# Patient Record
Sex: MALE | Race: WHITE | NOT HISPANIC OR LATINO | Employment: FULL TIME | URBAN - METROPOLITAN AREA
[De-identification: names, ages, dates, MRNs, and addresses within clinical notes are randomized per-mention and may not be internally consistent; named-entity substitution may affect disease eponyms.]

---

## 2019-03-03 ENCOUNTER — TELEPHONE (OUTPATIENT)
Dept: SCHEDULING | Facility: IMAGING CENTER | Age: 24
End: 2019-03-03

## 2019-03-17 ENCOUNTER — OFFICE VISIT (OUTPATIENT)
Dept: URGENT CARE | Facility: CLINIC | Age: 24
End: 2019-03-17
Payer: COMMERCIAL

## 2019-03-17 ENCOUNTER — APPOINTMENT (OUTPATIENT)
Dept: RADIOLOGY | Facility: IMAGING CENTER | Age: 24
End: 2019-03-17
Attending: FAMILY MEDICINE
Payer: COMMERCIAL

## 2019-03-17 VITALS
SYSTOLIC BLOOD PRESSURE: 110 MMHG | RESPIRATION RATE: 16 BRPM | DIASTOLIC BLOOD PRESSURE: 68 MMHG | HEART RATE: 76 BPM | TEMPERATURE: 99.2 F | BODY MASS INDEX: 21 KG/M2 | OXYGEN SATURATION: 97 % | HEIGHT: 71 IN | WEIGHT: 150 LBS

## 2019-03-17 DIAGNOSIS — M25.532 ACUTE PAIN OF LEFT WRIST: ICD-10-CM

## 2019-03-17 DIAGNOSIS — S63.502A SPRAIN OF LEFT WRIST, INITIAL ENCOUNTER: ICD-10-CM

## 2019-03-17 PROCEDURE — 99202 OFFICE O/P NEW SF 15 MIN: CPT | Performed by: FAMILY MEDICINE

## 2019-03-17 PROCEDURE — 73110 X-RAY EXAM OF WRIST: CPT | Mod: TC,LT | Performed by: FAMILY MEDICINE

## 2019-03-17 RX ORDER — IBUPROFEN 200 MG
600 TABLET ORAL ONCE
Status: COMPLETED | OUTPATIENT
Start: 2019-03-17 | End: 2019-03-17

## 2019-03-17 RX ORDER — ATENOLOL 100 MG/1
100 TABLET ORAL DAILY
COMMUNITY

## 2019-03-17 RX ADMIN — Medication 600 MG: at 18:37

## 2019-03-17 ASSESSMENT — ENCOUNTER SYMPTOMS
SENSORY CHANGE: 0
WEIGHT LOSS: 0
FOCAL WEAKNESS: 0

## 2019-03-18 NOTE — PROGRESS NOTES
"Subjective:      Edgardo St is a 23 y.o. male who presents with Wrist Injury (Lt wrist he doesnt know, he was at work and he is squeeshing a dog toy and that started the pain, when he turn his hand to a supine position it radiates the pain to his upper arm)            Onset today left wrist pain triggered by squeezing a dog toy. Pain is severe and worse with any movement. No prior injury. No PMH RA/inflammatory arthritis. Right hand dominant. No OTC medications. No other aggravating or alleviating factors.          Review of Systems   Constitutional: Negative for malaise/fatigue and weight loss.   Musculoskeletal:        No other joints affected   Skin: Negative for itching and rash.   Neurological: Negative for sensory change and focal weakness.     .  Medications, Allergies, and current problem list reviewed today in Epic       Objective:     /68   Pulse 76   Temp 37.3 °C (99.2 °F)   Resp 16   Ht 1.803 m (5' 11\")   Wt 68 kg (150 lb)   SpO2 97%   BMI 20.92 kg/m²      Physical Exam   Constitutional: He is oriented to person, place, and time. He appears well-developed. No distress.   HENT:   Head: Normocephalic.   Eyes: Conjunctivae are normal.   Musculoskeletal:   Left wrist: diffusely tender without deformity. No swelling or warmth. Pain reproduced with movement in all planes. Distal neuro/vascular intact.      Neurological: He is alert and oriented to person, place, and time.   Skin: Skin is warm and dry.               Assessment/Plan:   Xray: no fracture or dislocation by my read. Radiology review pending.    1. Sprain of left wrist, initial encounter     2. Acute pain of left wrist  DX-WRIST-COMPLETE 3+ LEFT     Differential diagnosis, natural history, supportive care, and indications for immediate follow-up discussed at length.     Relative rest, ice, nsaid prn. Elevation and compression prn swelling. Resume activity as tolerated.        "

## 2019-04-01 ENCOUNTER — OFFICE VISIT (OUTPATIENT)
Dept: URGENT CARE | Facility: MEDICAL CENTER | Age: 24
End: 2019-04-01
Payer: COMMERCIAL

## 2019-04-01 VITALS
SYSTOLIC BLOOD PRESSURE: 108 MMHG | TEMPERATURE: 98.6 F | HEART RATE: 78 BPM | HEIGHT: 71 IN | WEIGHT: 168 LBS | DIASTOLIC BLOOD PRESSURE: 70 MMHG | BODY MASS INDEX: 23.52 KG/M2 | OXYGEN SATURATION: 98 %

## 2019-04-01 DIAGNOSIS — R51.9 SINUS HEADACHE: ICD-10-CM

## 2019-04-01 DIAGNOSIS — H10.32 ACUTE BACTERIAL CONJUNCTIVITIS OF LEFT EYE: ICD-10-CM

## 2019-04-01 DIAGNOSIS — J01.40 ACUTE NON-RECURRENT PANSINUSITIS: Primary | ICD-10-CM

## 2019-04-01 PROCEDURE — 99214 OFFICE O/P EST MOD 30 MIN: CPT | Performed by: PHYSICIAN ASSISTANT

## 2019-04-01 RX ORDER — EMTRICITABINE AND TENOFOVIR DISOPROXIL FUMARATE 200; 300 MG/1; MG/1
1 TABLET, FILM COATED ORAL DAILY
COMMUNITY
End: 2020-03-31

## 2019-04-01 RX ORDER — AMOXICILLIN AND CLAVULANATE POTASSIUM 875; 125 MG/1; MG/1
1 TABLET, FILM COATED ORAL 2 TIMES DAILY
Qty: 14 TAB | Refills: 0 | Status: SHIPPED | OUTPATIENT
Start: 2019-04-01 | End: 2019-04-08

## 2019-04-01 RX ORDER — POLYMYXIN B SULFATE AND TRIMETHOPRIM 1; 10000 MG/ML; [USP'U]/ML
1 SOLUTION OPHTHALMIC EVERY 4 HOURS
Qty: 10 ML | Refills: 0 | Status: SHIPPED | OUTPATIENT
Start: 2019-04-01 | End: 2019-04-05

## 2019-04-01 RX ORDER — IBUPROFEN 800 MG/1
800 TABLET ORAL EVERY 8 HOURS PRN
Qty: 30 TAB | Refills: 3 | Status: SHIPPED | OUTPATIENT
Start: 2019-04-01 | End: 2019-04-05

## 2019-04-01 NOTE — PROGRESS NOTES
Subjective:      Pt is a 23 y.o. male who presents with Headache (x1week , left eye was swollen for two days with discharge (not now), congested, was coughing . headache)            HPI  This is a new problem. PT presents to  clinic today complaining of sore throat, watery red eyes, pressure in ears, cough, fatigue, runny nose, post nasal drip, sinus pressure and headache. PT denies CP, SOB, NVD, abdominal pain, joint pain. PT states these symptoms began around 1 week ago. PT states the pain is a 6/10 with sinus headache, aching in nature and worse at night.  Pt has not taken any RX medications for this condition. The pt's medication list, problem list, and allergies have been evaluated and reviewed during today's visit.    PMH:  Negative per pt.      PSH:  Negative per pt.      Fam Hx:  the patient's family history is not pertinent to their current complaint    Soc HX:  Social History     Social History   • Marital status: Single     Spouse name: N/A   • Number of children: N/A   • Years of education: N/A     Occupational History   • Not on file.     Social History Main Topics   • Smoking status: Never Smoker   • Smokeless tobacco: Never Used   • Alcohol use Yes      Comment: occa   • Drug use: No   • Sexual activity: Not on file     Other Topics Concern   • Not on file     Social History Narrative   • No narrative on file         Medications:    Current Outpatient Prescriptions:   •  emtricitabine-tenofovir (TRUVADA) 200-300 MG per tablet, Take 1 Tab by mouth every day., Disp: , Rfl:   •  ibuprofen (MOTRIN) 800 MG Tab, Take 1 Tab by mouth every 8 hours as needed for up to 10 days., Disp: 30 Tab, Rfl: 3  •  amoxicillin-clavulanate (AUGMENTIN) 875-125 MG Tab, Take 1 Tab by mouth 2 times a day for 7 days., Disp: 14 Tab, Rfl: 0  •  polymixin-trimethoprim (POLYTRIM) 83010-3.1 UNIT/ML-% Solution, Place 1 Drop in both eyes every 4 hours., Disp: 10 mL, Rfl: 0  •  atenolol (TENORMIN) 100 MG Tab, Take 100 mg by mouth  "every day., Disp: , Rfl:       Allergies:  Patient has no known allergies.    ROS  Review of Systems   Constitutional: Positive for malaise/fatigue. Negative for fever.   HENT: Positive for congestion and sore throat from postnasal drip with associated sinus pressure and fullness.    Eyes: Negative for blurred vision, double vision and photophobia. +left eye redness  Respiratory: Positive for cough and sputum production. Negative for hemoptysis, shortness of breath and wheezing.    Cardiovascular: Negative for chest pain and palpitations.   Gastrointestinal: Negative for nausea, vomiting, abdominal pain, diarrhea and constipation.   Genitourinary: Negative for dysuria and flank pain.   Musculoskeletal: Negative for joint pain and myalgias.   Skin: Negative for itching and rash.   Neurological: Positive for headaches. Negative for dizziness and tingling.   Endo/Heme/Allergies: Does not bruise/bleed easily.   Psychiatric/Behavioral: Negative for depression. The patient is not nervous/anxious.           Objective:     /70   Pulse 78   Temp 37 °C (98.6 °F) (Temporal)   Ht 1.803 m (5' 11\")   Wt 76.2 kg (168 lb)   SpO2 98%   BMI 23.43 kg/m²      Physical Exam            Physical Exam   Constitutional: Pt is oriented to person, place, and time. Pt appears well-developed and well-nourished. No distress.   HENT:   Head: Normocephalic and atraumatic.   Right Ear: Hearing, tympanic membrane, external ear and ear canal normal.   Left Ear: Hearing, tympanic membrane, external ear and ear canal normal.   Nose: Mucosal edema, rhinorrhea and sinus tenderness present. No nose lacerations, nasal deformity, septal deviation or nasal septal hematoma. No epistaxis.  No foreign bodies. Right sinus exhibits maxillary sinus tenderness and frontal sinus tenderness. Left sinus exhibits maxillary sinus tenderness and frontal sinus tenderness.   Mouth/Throat: Oropharynx is clear and moist. No oropharyngeal exudate.   Eyes: " Conjunctivae injected and EOM are normal. Pupils are equal, round, and reactive to light. Right eye exhibits no discharge. Left eye exhibits discharge.  Neck: Normal range of motion. Neck supple. No tracheal deviation present. No thyromegaly present.   Cardiovascular: Normal rate, regular rhythm, normal heart sounds and intact distal pulses.  Exam reveals no gallop and no friction rub.    No murmur heard.  Pulmonary/Chest: Effort normal and breath sounds normal. No respiratory distress. Pt has no wheezes. Pt has no rales. Pt exhibits no tenderness.   Abdominal: Soft. Bowel sounds are normal. Pt exhibits no distension and no mass. There is no tenderness. There is no rebound and no guarding.   Musculoskeletal: Normal range of motion. Pt exhibits no edema and no tenderness.   Lymphadenopathy:     Pt has no cervical adenopathy.   Neurological: Pt is alert and oriented to person, place, and time. Pt has normal reflexes. Pt displays normal reflexes. No cranial nerve deficit. Pt exhibits normal muscle tone. Coordination normal.   Skin: Skin is warm and dry. No rash noted. No erythema.   Psychiatric: Pt has a normal mood and affect. Pt behavior is normal. Judgment and thought content normal.       Assessment/Plan:     1. Acute non-recurrent pansinusitis    - amoxicillin-clavulanate (AUGMENTIN) 875-125 MG Tab; Take 1 Tab by mouth 2 times a day for 7 days.  Dispense: 14 Tab; Refill: 0    2. Acute bacterial conjunctivitis of left eye    - polymixin-trimethoprim (POLYTRIM) 78738-8.1 UNIT/ML-% Solution; Place 1 Drop in both eyes every 4 hours.  Dispense: 10 mL; Refill: 0    3. Sinus headache    - ibuprofen (MOTRIN) 800 MG Tab; Take 1 Tab by mouth every 8 hours as needed for up to 10 days.  Dispense: 30 Tab; Refill: 3    Rest, fluids encouraged.  OTC decongestant for congestion  Note given for work.  AVS with medical info given.  Pt was in full understanding and agreement with the plan.  Differential diagnosis, natural history,  supportive care, and indications for immediate follow-up discussed. All questions answered. Patient agrees with the plan of care.  Follow-up as needed if symptoms worsen or fail to improve.

## 2019-04-01 NOTE — LETTER
April 1, 2019       Patient: Edgardo St   YOB: 1995   Date of Visit: 4/1/2019         To Whom It May Concern:    It is my medical opinion that Edgardo St may be excused from work for the dates of 4/1/19-4/3/19.      If you have any questions or concerns, please don't hesitate to call [phone]          Sincerely,          Claude Iraheta P.A.-C.  Electronically Signed

## 2019-04-05 ENCOUNTER — OFFICE VISIT (OUTPATIENT)
Dept: INTERNAL MEDICINE | Facility: MEDICAL CENTER | Age: 24
End: 2019-04-05
Payer: COMMERCIAL

## 2019-04-05 VITALS
DIASTOLIC BLOOD PRESSURE: 62 MMHG | SYSTOLIC BLOOD PRESSURE: 96 MMHG | OXYGEN SATURATION: 97 % | HEART RATE: 72 BPM | TEMPERATURE: 98.4 F | BODY MASS INDEX: 22.4 KG/M2 | HEIGHT: 71 IN | WEIGHT: 160 LBS

## 2019-04-05 DIAGNOSIS — J01.90 ACUTE NON-RECURRENT SINUSITIS, UNSPECIFIED LOCATION: ICD-10-CM

## 2019-04-05 DIAGNOSIS — Z00.00 HEALTHCARE MAINTENANCE: ICD-10-CM

## 2019-04-05 DIAGNOSIS — I42.1 HYPERTROPHIC OBSTRUCTIVE CARDIOMYOPATHY (HOCM) (HCC): ICD-10-CM

## 2019-04-05 DIAGNOSIS — H10.32 ACUTE BACTERIAL CONJUNCTIVITIS OF LEFT EYE: ICD-10-CM

## 2019-04-05 PROCEDURE — 99204 OFFICE O/P NEW MOD 45 MIN: CPT | Mod: GC | Performed by: INTERNAL MEDICINE

## 2019-04-05 RX ORDER — TOBRAMYCIN 3 MG/ML
SOLUTION/ DROPS OPHTHALMIC
Refills: 0 | COMMUNITY
Start: 2019-03-28 | End: 2019-04-05

## 2019-04-05 RX ORDER — ATENOLOL 50 MG/1
75 TABLET ORAL
Refills: 11 | COMMUNITY
Start: 2019-01-09 | End: 2019-04-05

## 2019-04-05 ASSESSMENT — PAIN SCALES - GENERAL: PAINLEVEL: 3=SLIGHT PAIN

## 2019-04-05 ASSESSMENT — PATIENT HEALTH QUESTIONNAIRE - PHQ9
5. POOR APPETITE OR OVEREATING: 0 - NOT AT ALL
CLINICAL INTERPRETATION OF PHQ2 SCORE: 2
SUM OF ALL RESPONSES TO PHQ QUESTIONS 1-9: 6

## 2019-04-05 NOTE — PROGRESS NOTES
New Patient to Establish    Reason to establish: New patient to establish    CC: Referral made for cardiology, establish care, conjunctivitis    HPI: Mr. St is a very pleasant 23-year-old male with past medical history significant for hypertrophic obstructive cardiomyopathy presents to the clinic to establish care.    His previous primary care physician is in [facility], and is receiving some health care from the [facility] as well.  He has been doing fine and no current complaints.    Reports he recently got tested for renal function and STD testing about a week and half ago at the [facility].  Was told all the labs look normal.  He is okay to sign release request form for records from the place.    Has been following Dr. Merchant, pediatric cardiologist since he was diagnosed with hypertrophic obstructive cardiomyopathy.  He wishes to continue follow-up with her.  He is on atenolol, and has been taking it for the past several years now.  Reports his heart condition is stable, and he gets yearly testing including echo, EKG and stress test.    He is also on Truvada for preexposure prophylaxis.  Sometimes he uses barrier protection, and other times he does not.      Patient Active Problem List    Diagnosis Date Noted   • Hypertrophic obstructive cardiomyopathy (HOCM) ([facility]) 04/05/2019       Past Medical History:   Diagnosis Date   • Cardiomyopathy ([facility])        Current Outpatient Prescriptions   Medication Sig Dispense Refill   • emtricitabine-tenofovir (TRUVADA) 200-300 MG per tablet Take 1 Tab by mouth every day.     • amoxicillin-clavulanate (AUGMENTIN) 875-125 MG Tab Take 1 Tab by mouth 2 times a day for 7 days. 14 Tab 0   • atenolol (TENORMIN) 100 MG Tab Take 100 mg by mouth every day.       No current facility-administered medications for this visit.        Allergies as of 04/05/2019   • (No Known Allergies)       Social History     Social History   • Marital  "status: Single     Spouse name: N/A   • Number of children: N/A   • Years of education: N/A     Occupational History   • Not on file.     Social History Main Topics   • Smoking status: Never Smoker   • Smokeless tobacco: Never Used   • Alcohol use 0.0 - 0.6 oz/week   • Drug use: No   • Sexual activity: No     Other Topics Concern   • Not on file     Social History Narrative   • No narrative on file       Family History   Problem Relation Age of Onset   • Psychiatry Mother         Mental health   • Heart Disease Father         Hypertrophic cardiomyopathy   • Diabetes Father    • Stroke Sister         Age 27   • Cancer Neg Hx        Past Surgical History:   Procedure Laterality Date   • COLONOSCOPY      [facility] at age 22. Was normal.       ROS: As per HPI. Additional pertinent systems as noted below.  Constitutional: Negative for chills and fever.   HENT: Negative for ear pain and sore throat.    Eyes: Negative for discharge and redness.   Respiratory: Negative for cough, hemoptysis, wheezing and stridor.    Cardiovascular: Negative for chest pain, palpitations and leg swelling.   Gastrointestinal: Negative for abdominal pain, constipation, diarrhea, heartburn, nausea and vomiting.   Genitourinary: Negative for dysuria, flank pain and hematuria.   Musculoskeletal: Negative for falls and myalgias.   Skin: Negative for itching and rash.   Neurological: Negative for dizziness, seizures, loss of consciousness and headaches.   Endo/Heme/Allergies: Negative for polydipsia. Does not bruise/bleed easily.   Psychiatric/Behavioral: Negative for substance abuse and suicidal ideas.       BP (!) 96/62 (BP Location: Left arm, Patient Position: Sitting, BP Cuff Size: Adult)   Pulse 72   Temp 36.9 °C (98.4 °F) (Temporal)   Ht 1.81 m (5' 11.26\")   Wt 72.6 kg (160 lb)   SpO2 97%   BMI 22.15 kg/m²     Physical Exam  General:  Alert and oriented, No apparent distress.    Eyes: Pupils equal and reactive. No scleral " icterus.    Throat: Clear no erythema or exudates noted.    Neck: Supple. No lymphadenopathy noted. Thyroid not enlarged.    Lungs: Clear to auscultation and percussion bilaterally.    Cardiovascular: Regular rate and rhythm. No murmurs, rubs or gallops.    Abdomen:  Benign. No rebound or guarding noted.    Extremities: No clubbing, cyanosis, edema.    Skin: Clear. No rash or suspicious skin lesions noted.    Ear: Right ear is slightly inflamed, improving.    Note: I have reviewed all pertinent labs and diagnostic tests associated with this visit with specific comments listed under the assessment and plan below    Assessment and Plan    1. Hypertrophic obstructive cardiomyopathy (HOCM) ([facility])  Following Dr. Merchant.  Stable, continue atenolol per cardiology.  Gets yearly echoes, EKGs and treadmill stress test.  He avoids extreme physical exertion.  No recent symptoms of loss of consciousness, dizziness, palpitations or chest pain.  - Comp Metabolic Panel; Future  - REFERRAL TO CARDIOLOGY    2. Acute non-recurrent sinusitis, unspecified location  No tenderness present.  Appears to be improving.  Complete remaining 2 days of antibiotics.    3. Acute bacterial conjunctivitis of left eye  No signs of conjunctivitis.  Was using tobramycin eyedrops.  Advised to stop eyedrops.    4.  Preexposure prophylaxis for HIV  Patient reports that he does not have stable partner.  Counseled patient strongly recommended using barrier protection as well.  Continue Truvada for now, patient reports that he has refills.  Advised HIV testing every 3 months, renal function every 6 months.  He does not need to come into clinic for lab test but we advised him that at time of refills we can get the lab tests and follow up on it.  Recommended yearly follow-ups.    5. Healthcare maintenance  Up-to-date on vaccines.  Will get HIV screening in about 3 months, got it done recently.  - HIV AG/AB COMBO ASSAY SCREENING; Future        Followup: Return in  about 6 months (around 10/5/2019), or if symptoms worsen or fail to improve.        Signed by: Orville Asif M.D.

## 2019-04-09 DIAGNOSIS — I42.1 HYPERTROPHIC OBSTRUCTIVE CARDIOMYOPATHY (HOCM) (HCC): ICD-10-CM

## 2020-01-03 ENCOUNTER — OFFICE VISIT (OUTPATIENT)
Dept: URGENT CARE | Facility: CLINIC | Age: 25
End: 2020-01-03
Payer: COMMERCIAL

## 2020-01-03 VITALS
OXYGEN SATURATION: 95 % | HEIGHT: 72 IN | WEIGHT: 180.2 LBS | SYSTOLIC BLOOD PRESSURE: 120 MMHG | HEART RATE: 71 BPM | BODY MASS INDEX: 24.41 KG/M2 | TEMPERATURE: 97.3 F | DIASTOLIC BLOOD PRESSURE: 82 MMHG | RESPIRATION RATE: 14 BRPM

## 2020-01-03 DIAGNOSIS — H92.01 RIGHT EAR PAIN: ICD-10-CM

## 2020-01-03 DIAGNOSIS — H72.91 PERFORATION OF RIGHT TYMPANIC MEMBRANE: ICD-10-CM

## 2020-01-03 DIAGNOSIS — H66.013 NON-RECURRENT ACUTE SUPPURATIVE OTITIS MEDIA OF BOTH EARS WITH SPONTANEOUS RUPTURE OF TYMPANIC MEMBRANES: ICD-10-CM

## 2020-01-03 PROCEDURE — 99214 OFFICE O/P EST MOD 30 MIN: CPT | Performed by: NURSE PRACTITIONER

## 2020-01-03 RX ORDER — AMOXICILLIN AND CLAVULANATE POTASSIUM 875; 125 MG/1; MG/1
1 TABLET, FILM COATED ORAL 2 TIMES DAILY
Qty: 20 TAB | Refills: 0 | Status: SHIPPED | OUTPATIENT
Start: 2020-01-03 | End: 2020-01-13

## 2020-01-03 ASSESSMENT — ENCOUNTER SYMPTOMS
VOMITING: 0
EYE PAIN: 0
FEVER: 0
SHORTNESS OF BREATH: 0
MYALGIAS: 0
RHINORRHEA: 0
COUGH: 0
CHILLS: 0
SORE THROAT: 0
DIZZINESS: 0
NAUSEA: 0

## 2020-01-04 NOTE — PROGRESS NOTES
"Subjective:   Edgardo St  is a 24 y.o. male who presents for Otalgia (Bilateral x10 days)        Otalgia    There is pain in both ears. This is a new problem. Episode onset: 10 days; right ear severely worse after driving over daughter past this evening felt a \"pop\" followed by nausea and dizziness. The problem occurs constantly. The problem has been gradually worsening. There has been no fever. The pain is at a severity of 10/10. The pain is severe. Associated symptoms include hearing loss. Pertinent negatives include no coughing, ear discharge, rash, rhinorrhea, sore throat or vomiting. He has tried acetaminophen for the symptoms. The treatment provided no relief. There is no history of a chronic ear infection or hearing loss.     Review of Systems   Constitutional: Negative for chills and fever.   HENT: Positive for ear pain, hearing loss and tinnitus. Negative for ear discharge, rhinorrhea and sore throat.    Eyes: Negative for pain.   Respiratory: Negative for cough and shortness of breath.    Cardiovascular: Negative for chest pain.   Gastrointestinal: Negative for nausea and vomiting.   Genitourinary: Negative for hematuria.   Musculoskeletal: Negative for myalgias.   Skin: Negative for rash.   Neurological: Negative for dizziness.     No Known Allergies   Objective:   /82   Pulse 71   Temp 36.3 °C (97.3 °F)   Resp 14   Ht 1.829 m (6')   Wt 81.7 kg (180 lb 3.2 oz)   SpO2 95%   BMI 24.44 kg/m²   Physical Exam  Nursing note reviewed.   Constitutional:       General: He is not in acute distress.     Appearance: He is well-developed.   HENT:      Head: Normocephalic and atraumatic.      Right Ear: External ear normal. No drainage, swelling or tenderness. Tympanic membrane is perforated. Tympanic membrane is not bulging.      Left Ear: External ear normal. Tympanic membrane is erythematous and bulging.      Nose: Nose normal.      Right Sinus: No maxillary sinus tenderness or frontal sinus " tenderness.      Left Sinus: No maxillary sinus tenderness or frontal sinus tenderness.      Mouth/Throat:      Mouth: Mucous membranes are moist.      Pharynx: Uvula midline. No posterior oropharyngeal erythema.      Tonsils: No tonsillar exudate or tonsillar abscesses.   Eyes:      General:         Right eye: No discharge.         Left eye: No discharge.      Conjunctiva/sclera: Conjunctivae normal.      Pupils: Pupils are equal, round, and reactive to light.   Cardiovascular:      Rate and Rhythm: Normal rate and regular rhythm.      Heart sounds: No murmur.   Pulmonary:      Effort: Pulmonary effort is normal. No respiratory distress.      Breath sounds: Normal breath sounds.   Abdominal:      General: There is no distension.      Palpations: Abdomen is soft.      Tenderness: There is no tenderness.   Musculoskeletal: Normal range of motion.   Skin:     General: Skin is warm and dry.   Neurological:      General: No focal deficit present.      Mental Status: He is alert and oriented to person, place, and time. Mental status is at baseline.      Gait: Gait (gait at baseline) normal.   Psychiatric:         Judgment: Judgment normal.           Assessment/Plan:   1. Right ear pain  - amoxicillin-clavulanate (AUGMENTIN) 875-125 MG Tab; Take 1 Tab by mouth 2 times a day for 10 days.  Dispense: 20 Tab; Refill: 0    2. Non-recurrent acute suppurative otitis media of both ears with spontaneous rupture of tympanic membranes  - amoxicillin-clavulanate (AUGMENTIN) 875-125 MG Tab; Take 1 Tab by mouth 2 times a day for 10 days.  Dispense: 20 Tab; Refill: 0    3. Perforation of right tympanic membrane  - amoxicillin-clavulanate (AUGMENTIN) 875-125 MG Tab; Take 1 Tab by mouth 2 times a day for 10 days.  Dispense: 20 Tab; Refill: 0  Advised to continue supportive care with Tylenol and/or ibuprofen for fevers and discomfort. Increased fluids and electrolytes.  Patient is scheduled to establish care with a new PCP 1/15 advised to  follow-up for reevaluation of the right ear.    Patient given precautionary s/sx that mandate immediate follow up and evaluation in the ED. Advised of risks of not doing so.    DDX, Supportive care, and indications for immediate follow-up discussed with patient.    Instructed to return to clinic or nearest emergency department if we are not available for any change in condition, further concerns, or worsening of symptoms.    The patient demonstrated a good understanding and agreed with the treatment plan.;l

## 2020-03-31 ENCOUNTER — OFFICE VISIT (OUTPATIENT)
Dept: MEDICAL GROUP | Facility: MEDICAL CENTER | Age: 25
End: 2020-03-31
Payer: COMMERCIAL

## 2020-03-31 VITALS
DIASTOLIC BLOOD PRESSURE: 66 MMHG | BODY MASS INDEX: 24.02 KG/M2 | SYSTOLIC BLOOD PRESSURE: 110 MMHG | TEMPERATURE: 99.3 F | OXYGEN SATURATION: 95 % | HEIGHT: 71 IN | HEART RATE: 60 BPM | WEIGHT: 171.6 LBS

## 2020-03-31 DIAGNOSIS — I42.1 HYPERTROPHIC OBSTRUCTIVE CARDIOMYOPATHY (HOCM) (HCC): ICD-10-CM

## 2020-03-31 DIAGNOSIS — L70.0 ACNE VULGARIS: ICD-10-CM

## 2020-03-31 DIAGNOSIS — Z13.29 THYROID DISORDER SCREEN: ICD-10-CM

## 2020-03-31 DIAGNOSIS — F41.9 ANXIETY AND DEPRESSION: ICD-10-CM

## 2020-03-31 DIAGNOSIS — R53.82 CHRONIC FATIGUE: ICD-10-CM

## 2020-03-31 DIAGNOSIS — F32.A ANXIETY AND DEPRESSION: ICD-10-CM

## 2020-03-31 PROCEDURE — 99214 OFFICE O/P EST MOD 30 MIN: CPT | Performed by: NURSE PRACTITIONER

## 2020-03-31 RX ORDER — BUPROPION HYDROCHLORIDE 150 MG/1
150 TABLET ORAL EVERY MORNING
Qty: 30 TAB | Refills: 1 | Status: SHIPPED | OUTPATIENT
Start: 2020-03-31

## 2020-03-31 RX ORDER — MINOCYCLINE HYDROCHLORIDE 100 MG/1
100 CAPSULE ORAL 2 TIMES DAILY
Qty: 60 CAP | Refills: 5 | Status: SHIPPED | OUTPATIENT
Start: 2020-03-31

## 2020-03-31 ASSESSMENT — PATIENT HEALTH QUESTIONNAIRE - PHQ9
SUM OF ALL RESPONSES TO PHQ QUESTIONS 1-9: 18
8. MOVING OR SPEAKING SO SLOWLY THAT OTHER PEOPLE COULD HAVE NOTICED. OR THE OPPOSITE, BEING SO FIGETY OR RESTLESS THAT YOU HAVE BEEN MOVING AROUND A LOT MORE THAN USUAL: 0
6. FEELING BAD ABOUT YOURSELF - OR THAT YOU ARE A FAILURE OR HAVE LET YOURSELF OR YOUR FAMILY DOWN: 3
7. TROUBLE CONCENTRATING ON THINGS, SUCH AS READING THE NEWSPAPER OR WATCHING TELEVISION: 3
4. FEELING TIRED OR HAVING LITTLE ENERGY: 3
1. LITTLE INTEREST OR PLEASURE IN DOING THINGS: 3
3. TROUBLE FALLING OR STAYING ASLEEP OR SLEEPING TOO MUCH: 2
5. POOR APPETITE OR OVEREATING: 1
SUM OF ALL RESPONSES TO PHQ9 QUESTIONS 1 AND 2: 5
2. FEELING DOWN, DEPRESSED, IRRITABLE, OR HOPELESS: 2
9. THOUGHTS THAT YOU WOULD BE BETTER OFF DEAD, OR OF HURTING YOURSELF: 1

## 2020-03-31 ASSESSMENT — ANXIETY QUESTIONNAIRES
3. WORRYING TOO MUCH ABOUT DIFFERENT THINGS: MORE THAN HALF THE DAYS
GAD7 TOTAL SCORE: 15
2. NOT BEING ABLE TO STOP OR CONTROL WORRYING: MORE THAN HALF THE DAYS
6. BECOMING EASILY ANNOYED OR IRRITABLE: SEVERAL DAYS
5. BEING SO RESTLESS THAT IT IS HARD TO SIT STILL: NEARLY EVERY DAY
4. TROUBLE RELAXING: MORE THAN HALF THE DAYS
1. FEELING NERVOUS, ANXIOUS, OR ON EDGE: MORE THAN HALF THE DAYS
7. FEELING AFRAID AS IF SOMETHING AWFUL MIGHT HAPPEN: NEARLY EVERY DAY
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
## 2020-03-31 NOTE — ASSESSMENT & PLAN NOTE
Hereditary condition, father had heart transplant. He is followed annually by cardiology and is on atenolol  No chest pain, dizziness, sob

## 2020-03-31 NOTE — ASSESSMENT & PLAN NOTE
Chronic difficulty with scattered inflammatory and pustular lesions on the face and upper back, some scarring over the last few years.  He is tried on retinol treatment in the past which did not seem to help.  He has not tried any oral antibiotics
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
## 2020-04-30 ENCOUNTER — APPOINTMENT (OUTPATIENT)
Dept: MEDICAL GROUP | Facility: MEDICAL CENTER | Age: 25
End: 2020-04-30
Payer: COMMERCIAL

## 2020-05-06 ENCOUNTER — OFFICE VISIT (OUTPATIENT)
Dept: MEDICAL GROUP | Facility: MEDICAL CENTER | Age: 25
End: 2020-05-06
Payer: COMMERCIAL

## 2020-05-06 VITALS
DIASTOLIC BLOOD PRESSURE: 62 MMHG | SYSTOLIC BLOOD PRESSURE: 128 MMHG | OXYGEN SATURATION: 97 % | HEIGHT: 72 IN | TEMPERATURE: 98.8 F | BODY MASS INDEX: 22.99 KG/M2 | HEART RATE: 70 BPM | RESPIRATION RATE: 16 BRPM | WEIGHT: 169.75 LBS

## 2020-05-06 DIAGNOSIS — F32.A ANXIETY AND DEPRESSION: ICD-10-CM

## 2020-05-06 DIAGNOSIS — R09.89 LYMPH NODE SYMPTOM: ICD-10-CM

## 2020-05-06 DIAGNOSIS — F41.9 ANXIETY AND DEPRESSION: ICD-10-CM

## 2020-05-06 DIAGNOSIS — L70.0 ACNE VULGARIS: ICD-10-CM

## 2020-05-06 PROCEDURE — 99214 OFFICE O/P EST MOD 30 MIN: CPT | Performed by: NURSE PRACTITIONER

## 2020-05-06 RX ORDER — BUPROPION HYDROCHLORIDE 150 MG/1
150 TABLET ORAL EVERY MORNING
Qty: 30 TAB | Refills: 11 | Status: SHIPPED | OUTPATIENT
Start: 2020-05-06

## 2020-05-06 ASSESSMENT — PATIENT HEALTH QUESTIONNAIRE - PHQ9
SUM OF ALL RESPONSES TO PHQ9 QUESTIONS 1 AND 2: 2
6. FEELING BAD ABOUT YOURSELF - OR THAT YOU ARE A FAILURE OR HAVE LET YOURSELF OR YOUR FAMILY DOWN: 1
1. LITTLE INTEREST OR PLEASURE IN DOING THINGS: 1
7. TROUBLE CONCENTRATING ON THINGS, SUCH AS READING THE NEWSPAPER OR WATCHING TELEVISION: 1
2. FEELING DOWN, DEPRESSED, IRRITABLE, OR HOPELESS: 1
5. POOR APPETITE OR OVEREATING: 0
SUM OF ALL RESPONSES TO PHQ QUESTIONS 1-9: 8
8. MOVING OR SPEAKING SO SLOWLY THAT OTHER PEOPLE COULD HAVE NOTICED. OR THE OPPOSITE, BEING SO FIGETY OR RESTLESS THAT YOU HAVE BEEN MOVING AROUND A LOT MORE THAN USUAL: 0
9. THOUGHTS THAT YOU WOULD BE BETTER OFF DEAD, OR OF HURTING YOURSELF: 1
4. FEELING TIRED OR HAVING LITTLE ENERGY: 1
3. TROUBLE FALLING OR STAYING ASLEEP OR SLEEPING TOO MUCH: 2

## 2020-05-06 ASSESSMENT — ANXIETY QUESTIONNAIRES
7. FEELING AFRAID AS IF SOMETHING AWFUL MIGHT HAPPEN: NOT AT ALL
6. BECOMING EASILY ANNOYED OR IRRITABLE: NOT AT ALL
3. WORRYING TOO MUCH ABOUT DIFFERENT THINGS: SEVERAL DAYS
5. BEING SO RESTLESS THAT IT IS HARD TO SIT STILL: SEVERAL DAYS
GAD7 TOTAL SCORE: 7
4. TROUBLE RELAXING: MORE THAN HALF THE DAYS
2. NOT BEING ABLE TO STOP OR CONTROL WORRYING: SEVERAL DAYS
1. FEELING NERVOUS, ANXIOUS, OR ON EDGE: MORE THAN HALF THE DAYS

## 2020-05-06 NOTE — PROGRESS NOTES
"Subjective:     Chief Complaint   Patient presents with   • Follow-Up     establishing care depresasion   • Other     and possible swollen lymphnodes     Edgardo St is a 24 y.o. male here today to follow up on:    Acne vulgaris  Chronic issue with inflammatory and pustular lesions on the face and upper back, some scarring.  He had tried retinal treatment in the past which did not help.  At last visit I had given him a trial of oral minocycline, he only took this for a few days and felt that it made him feel \"strange\" or flushed, he has since discontinued.  He would like to see dermatology    Anxiety and depression  Patient notes improvement with having started Wellbutrin 150 mg daily.  He did initially have some muscle tension primarily in the jaw but this dissipated after a few days.  He also had some difficulty with sleep for about 3 days which is now resolved.  Overall he feels that his mood and anxiety level have improved and is happy with the medication  No SI/HI, history of manic behavior, substance abuse  Patient Health Questionaire    Little interest or pleasure in doing things?: 1   Feeling down, depressed, or hopeless?: 1  Trouble falling or staying asleep, or sleeping too much? : 2  Feeling tired or having little energy? : 1  Poor appetite or overeating? : 0  Feeling bad about yourself - or that you are a failure or have let yourself or your family down? : 1  Trouble concentrating on things, such as reading the newspaper or watching television? : 1  Moving or speaking so slowly that other people could have noticed.  Or the opposite - being so fidgety or restless that you have been moving around alot more than usual. : 0  Thoughts that you would be better off dead, or of hurting yourself?: 1  Patient Health Questionnaire Score: 8    If depressive symptoms identified deferred to follow up visit unless specifically addressed in assesment and plan.    Interpretation of PHQ-9 Total Score   Score Severity "   1-4 No Depression   5-9 Mild Depression   10-14 Moderate Depression   15-19 Moderately Severe Depression   20-27 Severe Depression    ALICIA-7 Questionnaire    Feeling nervous, anxious, or on edge: More than half the days  Not being able to sop or control worrying: Several days  Worrying too much about different things: Several days  Trouble relaxing: More than half the days  Being so restless that it's hard to sit still: Several days  Becoming easily annoyed or irritable: Not at all  Feeling afraid as if something awful might happen: Not at all  Total: 7    Interpretation of ALICIA 7 Total Score   Score Severity :  0-4 No Anxiety   5-9 Mild Anxiety  10-14 Moderate Anxiety  15-21 Severe Anxiety        Lymph node symptom  About a month ago he noticed a lymph node under the chin, one in the right neck, and one in the left face area and is concerned about these.  They have not changed in size, there is no tenderness.  He has had some mild nasal congestion and sneezing which he thinks is allergy related.  Not currently using any allergy medications.  No sore throat, pain in the face or the teeth, ear pain.  Recent CBC reportedly normal, I am awaiting these results from [facility]       Current medicines (including changes today)  Current Outpatient Medications   Medication Sig Dispense Refill   • buPROPion (WELLBUTRIN XL) 150 MG XL tablet Take 1 Tab by mouth every morning. 30 Tab 11   • buPROPion (WELLBUTRIN XL) 150 MG XL tablet Take 1 Tab by mouth every morning. 30 Tab 1   • atenolol (TENORMIN) 100 MG Tab Take 100 mg by mouth every day.     • minocycline (MINOCIN) 100 MG Cap Take 1 Cap by mouth 2 times a day. (Patient not taking: Reported on 5/6/2020) 60 Cap 5     No current facility-administered medications for this visit.      He  has a past medical history of Cardiomyopathy (HCC).    ROS included above     Objective:     /62 (BP Location: Right arm, Patient Position: Sitting, BP Cuff Size: Adult)   Pulse 70   Temp  37.1 °C (98.8 °F) (Temporal)   Resp 16   Ht 1.829 m (6')   Wt 77 kg (169 lb 12.1 oz)   SpO2 97%  Body mass index is 23.02 kg/m².     Physical Exam:  General: Alert, oriented in no acute distress.  Eye contact is good, speech is normal, affect calm  HEENT: Palpable preauricular lymph node, submandibular lymph node, and lymph nodes along the cervical chain all within normal limits.  Mobile, nontender.  Nares with clear mucus.  Bilateral TMs gray and reflective with good landmarks.  Posterior oropharynx pink, moist, no tonsillar enlargement or exudate  Lungs: clear to auscultation bilaterally, normal effort, no wheeze/ rhonchi/ rales.  CV: regular rate and rhythm, S1, S2  Ext: no edema, color normal, vascularity normal, temperature normal    Assessment and Plan:   The following treatment plan was discussed  1. Anxiety and depression   improved with Wellbutrin, he is satisfied with current dose.  Refills provided.  Advised follow-up for any further concerns  buPROPion (WELLBUTRIN XL) 150 MG XL tablet   2. Acne vulgaris   feels that he did not tolerate minocycline, interested in seeing dermatology  REFERRAL TO DERMATOLOGY   3. Lymph node symptom   patient is concerned with palpable lymph nodes which she first noticed about a month ago, no change since that time.  I feel no pathological lymph nodes on exam, all are within normal size, mobile, nontender.  Discussed that lymph node size can fluctuate depending on what is going on with the body's immune system, allergies and congestion may be contributing.  He may keep an eye on this and contact me for any further concerns       Followup: Annually         Please note that this dictation was created using voice recognition software. I have worked with consultants from the vendor as well as technical experts from [employer] to optimize the interface. I have made every reasonable attempt to correct obvious errors, but I expect that there are errors of grammar and  possibly content that I did not discover before finalizing the note.

## 2020-05-06 NOTE — ASSESSMENT & PLAN NOTE
Patient notes improvement with having started Wellbutrin 150 mg daily.  He did initially have some muscle tension primarily in the jaw but this dissipated after a few days.  He also had some difficulty with sleep for about 3 days which is now resolved.  Overall he feels that his mood and anxiety level have improved and is happy with the medication  No SI/HI, history of manic behavior, substance abuse  Patient Health Questionaire    Little interest or pleasure in doing things?: 1   Feeling down, depressed, or hopeless?: 1  Trouble falling or staying asleep, or sleeping too much? : 2  Feeling tired or having little energy? : 1  Poor appetite or overeating? : 0  Feeling bad about yourself - or that you are a failure or have let yourself or your family down? : 1  Trouble concentrating on things, such as reading the newspaper or watching television? : 1  Moving or speaking so slowly that other people could have noticed.  Or the opposite - being so fidgety or restless that you have been moving around alot more than usual. : 0  Thoughts that you would be better off dead, or of hurting yourself?: 1  Patient Health Questionnaire Score: 8    If depressive symptoms identified deferred to follow up visit unless specifically addressed in assesment and plan.    Interpretation of PHQ-9 Total Score   Score Severity   1-4 No Depression   5-9 Mild Depression   10-14 Moderate Depression   15-19 Moderately Severe Depression   20-27 Severe Depression    ALICIA-7 Questionnaire    Feeling nervous, anxious, or on edge: More than half the days  Not being able to sop or control worrying: Several days  Worrying too much about different things: Several days  Trouble relaxing: More than half the days  Being so restless that it's hard to sit still: Several days  Becoming easily annoyed or irritable: Not at all  Feeling afraid as if something awful might happen: Not at all  Total: 7    Interpretation of ALICIA 7 Total Score   Score Severity :  0-4 No  Anxiety   5-9 Mild Anxiety  10-14 Moderate Anxiety  15-21 Severe Anxiety

## 2020-05-06 NOTE — LETTER
[facility]  RASHMI Banuelos.  [address]  [facility] [address]  Fax: [phone]   Authorization for Release/Disclosure of   Protected Health Information   Name: EDGARDO ST : 1995 SSN: [id number]   Address: [address] [place] [address] Phone:    [phone] (home)    I authorize the entity listed below to release/disclose the PHI below to:   [facility]/GIGI Banuelos and GIGI Banuelos   Provider or Entity Name:  [facility]   Address   City, State, Zip   Phone:      Fax:     Reason for request: continuity of care   Information to be released:    [  ] LAST COLONOSCOPY,  including any PATH REPORT and follow-up  [  ] LAST FIT/COLOGUARD RESULT [  ] LAST DEXA  [  ] LAST MAMMOGRAM  [  ] LAST PAP  [ x ] LAST LABS [  ] RETINA EXAM REPORT  [  ] IMMUNIZATION RECORDS  [  ] Release all info      [  ] Check here and initial the line next to each item to release ALL health information INCLUDING  _____ Care and treatment for drug and / or alcohol abuse  _____ HIV testing, infection status, or AIDS  _____ Genetic Testing    DATES OF SERVICE OR TIME PERIOD TO BE DISCLOSED: _____________  I understand and acknowledge that:  * This Authorization may be revoked at any time by you in writing, except if your health information has already been used or disclosed.  * Your health information that will be used or disclosed as a result of you signing this authorization could be re-disclosed by the recipient. If this occurs, your re-disclosed health information may no longer be protected by State or Federal laws.  * You may refuse to sign this Authorization. Your refusal will not affect your ability to obtain treatment.  * This Authorization becomes effective upon signing and will  on (date) __________.      If no date is indicated, this Authorization will  one (1) year from the signature date.    Name: Edgardo St    Signature:   Date:     2020       PLEASE FAX  REQUESTED RECORDS BACK TO: [phone]

## 2020-05-06 NOTE — ASSESSMENT & PLAN NOTE
About a month ago he noticed a lymph node under the chin, one in the right neck, and one in the left face area and is concerned about these.  They have not changed in size, there is no tenderness.  He has had some mild nasal congestion and sneezing which he thinks is allergy related.  Not currently using any allergy medications.  No sore throat, pain in the face or the teeth, ear pain.  Recent CBC reportedly normal, I am awaiting these results from [facility]

## 2020-05-06 NOTE — ASSESSMENT & PLAN NOTE
"Chronic issue with inflammatory and pustular lesions on the face and upper back, some scarring.  He had tried retinal treatment in the past which did not help.  At last visit I had given him a trial of oral minocycline, he only took this for a few days and felt that it made him feel \"strange\" or flushed, he has since discontinued.  He would like to see dermatology  "

## 2020-08-05 ENCOUNTER — OFFICE VISIT (OUTPATIENT)
Dept: URGENT CARE | Facility: PHYSICIAN GROUP | Age: 25
End: 2020-08-05
Payer: COMMERCIAL

## 2020-08-05 VITALS
DIASTOLIC BLOOD PRESSURE: 68 MMHG | WEIGHT: 172.6 LBS | RESPIRATION RATE: 16 BRPM | BODY MASS INDEX: 23.38 KG/M2 | HEART RATE: 74 BPM | OXYGEN SATURATION: 97 % | HEIGHT: 72 IN | SYSTOLIC BLOOD PRESSURE: 130 MMHG | TEMPERATURE: 99.5 F

## 2020-08-05 DIAGNOSIS — L02.91 ABSCESS: ICD-10-CM

## 2020-08-05 PROCEDURE — 99214 OFFICE O/P EST MOD 30 MIN: CPT | Performed by: NURSE PRACTITIONER

## 2020-08-05 RX ORDER — ATENOLOL 50 MG/1
75 TABLET ORAL
COMMUNITY
Start: 2020-07-20

## 2020-08-05 RX ORDER — ATENOLOL 50 MG/1
50 TABLET ORAL DAILY
COMMUNITY

## 2020-08-05 RX ORDER — ATENOLOL 25 MG/1
25 TABLET ORAL DAILY
COMMUNITY

## 2020-08-05 ASSESSMENT — ENCOUNTER SYMPTOMS
FEVER: 0
ABDOMINAL PAIN: 0
CHILLS: 0
VOMITING: 0
HEADACHES: 0
DIZZINESS: 0

## 2020-08-05 NOTE — PROGRESS NOTES
Subjective:   Edgardo St  is a 24 y.o. male who presents for Cyst (poss cyst, located on neck, gradually getting bigger, tender/sensitive, x3 days )        24-year-old male patient reports urgent care for new problem that he noticed 3 days ago.  Patient states that he has a cystlike lesion on the left side of his neck just to the side of his Kaleb's apple.  Patient denies fever, chills, nausea or vomiting denies any drainage out of it.  Has not taken anything or attempted to drain it himself.    Review of Systems   Constitutional: Negative for chills, fever and malaise/fatigue.   Gastrointestinal: Negative for abdominal pain and vomiting.   Neurological: Negative for dizziness and headaches.     Past Medical History:   Diagnosis Date   • Cardiomyopathy (HCC)       Past Surgical History:   Procedure Laterality Date   • COLONOSCOPY      [facility] at age 22. Was normal.      Social History     Socioeconomic History   • Marital status: Single     Spouse name: Not on file   • Number of children: Not on file   • Years of education: Not on file   • Highest education level: Not on file   Occupational History   • Not on file   Social Needs   • Financial resource strain: Not on file   • Food insecurity     Worry: Not on file     Inability: Not on file   • Transportation needs     Medical: Not on file     Non-medical: Not on file   Tobacco Use   • Smoking status: Never Smoker   • Smokeless tobacco: Never Used   Substance and Sexual Activity   • Alcohol use: Yes     Alcohol/week: 0.0 - 0.6 oz   • Drug use: No   • Sexual activity: Never     Partners: Male     Birth control/protection: Condom   Lifestyle   • Physical activity     Days per week: Not on file     Minutes per session: Not on file   • Stress: Not on file   Relationships   • Social connections     Talks on phone: Not on file     Gets together: Not on file     Attends Christian service: Not on file     Active member of club or organization: Not on file      "Attends meetings of clubs or organizations: Not on file     Relationship status: Not on file   • Intimate partner violence     Fear of current or ex partner: Not on file     Emotionally abused: Not on file     Physically abused: Not on file     Forced sexual activity: Not on file   Other Topics Concern   • Not on file   Social History Narrative   • Not on file    Patient has no known allergies.       Objective:   /68 (BP Location: Right arm, Patient Position: Sitting, BP Cuff Size: Adult)   Pulse 74   Temp 37.5 °C (99.5 °F) (Temporal)   Resp 16   Ht 1.829 m (6')   Wt 78.3 kg (172 lb 9.6 oz)   SpO2 97%   BMI 23.41 kg/m²   Physical Exam  Vitals signs reviewed.   Constitutional:       Appearance: Normal appearance.   Neck:     Cardiovascular:      Rate and Rhythm: Normal rate and regular rhythm.      Heart sounds: Normal heart sounds.   Pulmonary:      Effort: Pulmonary effort is normal.      Breath sounds: Normal breath sounds.   Skin:     General: Skin is warm.   Neurological:      Mental Status: He is alert and oriented to person, place, and time.   Psychiatric:         Mood and Affect: Mood normal.         Behavior: Behavior normal.         Thought Content: Thought content normal.         Judgment: Judgment normal.           Assessment/Plan:      1. Abscess  - REFERRAL TO GENERAL SURGERY    Procedure: Incision and Drainage  -Risks, benefits, and alternatives discussed. Risks include infection, bleeding, nerve damage, and poor cosmetic outcome  -Clean technique with sterile instruments  -Local anesthesia with 2% lidocaine with epinephrine  -Incision with #11 blade into fluctuant area with purulent material expressed  -Cavity probed and any loculations bluntly taken down with hemostat  -Irrigated copiously with sterile saline  -Packed with 1/4\" gauze  -Minimal bleeding with good hemostasis achieved  -The patient tolerated the procedure well    Discussed physical examination findings is consistent with an " abscess.  Will provide patient with a referral to general surgery if symptoms recur.  Advised patient to keep area clean dry and dressed for the next 2 days.    Supportive care, differential diagnoses, and indications for immediate follow-up discussed with patient.    Pathogenesis of diagnosis discussed including typical length and natural progression. Patient expresses understanding and agrees to plan.    Instructed patient to return to clinic for worsening symptoms or symptoms that persist for 7 to 10 days     Please note that this dictation was created using voice recognition software. I have made every reasonable attempt to correct obvious errors, but I expect that there are errors of grammar and possibly content that I did not discover before finalizing the note.

## 2020-11-04 ENCOUNTER — PATIENT MESSAGE (OUTPATIENT)
Dept: MEDICAL GROUP | Facility: MEDICAL CENTER | Age: 25
End: 2020-11-04

## 2020-11-09 ENCOUNTER — HOSPITAL ENCOUNTER (OUTPATIENT)
Dept: LAB | Facility: MEDICAL CENTER | Age: 25
End: 2020-11-09
Payer: COMMERCIAL

## 2020-11-09 LAB — COVID ORDER STATUS COVID19: NORMAL

## 2020-11-10 LAB
SARS-COV-2 RNA RESP QL NAA+PROBE: NOTDETECTED
SPECIMEN SOURCE: NORMAL

## 2020-11-25 ENCOUNTER — HOSPITAL ENCOUNTER (OUTPATIENT)
Dept: LAB | Facility: MEDICAL CENTER | Age: 25
End: 2020-11-25
Payer: COMMERCIAL

## 2020-11-26 LAB
COVID ORDER STATUS COVID19: NORMAL
SARS-COV-2 RNA RESP QL NAA+PROBE: NOTDETECTED
SPECIMEN SOURCE: NORMAL